# Patient Record
Sex: FEMALE | Race: BLACK OR AFRICAN AMERICAN
[De-identification: names, ages, dates, MRNs, and addresses within clinical notes are randomized per-mention and may not be internally consistent; named-entity substitution may affect disease eponyms.]

---

## 2017-03-17 NOTE — EMERGENCY ROOM REPORT
History of Present Illness


General


Chief Complaint:  Upper Extremity Injury


Source:  Patient





Present Illness


HPI


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  She felt a strain in her right 

shoulder with increased pain.  She's had a history of tendinitis in her past.  

The pain radiates around to her back and slightly up towards the neck.  She 

denies any numbness.  The pain is 9/10.  Worsened when she moves her arm.





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  She 

drove herself here.





No fevers, chest pain, dyspnea, NVD, neck pain.


Allergies:  


Coded Allergies:  


     PENICILLINS (Verified  Allergy, rash, 13)





Patient History


Past Medical History:  see triage record


Social History:  Denies: smoking


Social History Narrative


patient care


Last Menstrual Period:  3/17/2017


Pregnant Now:  No


:  0


Para:  0





Nursing Documentation-PMH


Past Medical History:  No Stated History





Review of Systems


All Other Systems:  negative except mentioned in HPI





Physical Exam





Vital Signs








  Date Time  Temp Pulse Resp B/P Pulse Ox O2 Delivery O2 Flow Rate FiO2


 


3/17/17 20:56 98.1 84 16 142/88 100 Room Air  








Sp02 EP Interpretation:  reviewed, normal


General Appearance:  well appearing, no apparent distress, GCS 15


Head:  normocephalic, atraumatic


Eyes:  bilateral eye PERRL, bilateral eye normal inspection


ENT:  hearing grossly normal, normal voice


Neck:  full range of motion, supple


Respiratory:  chest non-tender, no respiratory distress, speaking full sentences


Cardiovascular #2:  2+ radial (R)


Musculoskeletal:  decreased range of mation - R shoulder - unable to lift to 

breast height or adduct.  No elbow tenderness.  No deformity.  No AC joint pain


Neurologic:  alert, motor strength/tone normal, DTRs symmetric, sensory intact, 

normal gait, speech normal


Psychiatric:  mood/affect normal


Reflexes:  2+ bicep (R)


Skin:  no rash





Medical Decision Making


Diagnostic Impression:  


 Primary Impression:  


 Right shoulder strain


 Qualified Codes:  S46.911A - Strain of unspecified muscle, fascia and tendon 

at shoulder and upper arm level, right arm, initial encounter


ER Course


Patient presents with R shoulder pain.  Ddx; sprain, strain, bursitis.  Exam 

against fx or dislocation.  Some suggestion of rotator cuff issue.  Needs 

analgesia.  





X rays not indicated due to mechanism of injury and exam.





Sling applied by tech. Position good and neurovasc normal per my exam.  

Improved with sling.





Improved with treatment.  Insisting on time off of work instead of limited work.





Patient stable for outpatient observation and treatment.





Last Vital Signs








  Date Time  Temp Pulse Resp B/P Pulse Ox O2 Delivery O2 Flow Rate FiO2


 


3/17/17 22:04 98.1 84 16 142/88 100 Room Air  








Status:  improved


Disposition:  HOME, SELF-CARE


Condition:  Improved


Scripts


Tramadol Hcl* (ULTRAM*) 50 Mg Tablet


50 MG ORAL Q6H Y for For Pain, #10 TAB 0 Refills


   Prov: Yariel Bullock M.D.         3/17/17 


Ibuprofen* (MOTRIN*) 600 Mg Tablet


600 MG ORAL Q6H Y for For Pain, #20 TAB


   Prov: Yariel Bullock M.D.         3/17/17











Yariel Bullock M.D. Mar 17, 2017 21:24

## 2017-06-15 NOTE — EMERGENCY ROOM REPORT
History of Present Illness


General


Chief Complaint:  Lower Back Pain or Injury


Source:  Patient





Present Illness


HPI


28 YO Female presents to the ED c/o Left-sided low back pain that shoots down 

the  leg x 2 day(s), acute onset after strenuous activity. denies N/V/F/C , no 

hx of neoplastic disease. Denies trauma or fall.  Patient denies paresthesias 

in the lower extremities she states that with certain movements or laying flat 

pain will shoot down into the left buttock intermittently.  Denies erythema, 

bruising, or increased temperature.Denies abdominal pain, pregnancy, dysuria, 

hematuria, or frequency. Denies numbness tingling or loss of sensation or gross 

motor movements of the extremities, incontinence of bowel or bladder. Denies CP

, Palpitations, LOC, AMS, dizziness, Changes in Vision, Sensation, paresthesias

, or a sudden severe headache.


Allergies:  


Coded Allergies:  


     PENICILLINS (Verified  Allergy, rash, 7/6/13)





Patient History


Past Medical History:  see triage record


Past Surgical History:  none


Pertinent Family History:  none


Last Menstrual Period:  6/5/17


Pregnant Now:  No


Reviewed Nursing Documentation:  PMH: Agreed, PSxH: Agreed





Nursing Documentation-PMH


Past Medical History:  No Stated History





Review of Systems


All Other Systems:  negative except mentioned in HPI





Physical Exam





Vital Signs








  Date Time  Temp Pulse Resp B/P Pulse Ox O2 Delivery O2 Flow Rate FiO2


 


6/15/17 12:43 98.2 80 16 143/84 100   








Sp02 EP Interpretation:  reviewed, normal


General Appearance:  no apparent distress, alert, GCS 15, non-toxic


Head:  normocephalic, atraumatic


Eyes:  bilateral eye PERRL, bilateral eye normal inspection


ENT:  hearing grossly normal, normal pharynx, no angioedema, normal voice


Neck:  full range of motion, supple/symm/no masses


Respiratory:  chest non-tender, lungs clear, normal breath sounds, speaking 

full sentences


Cardiovascular #1:  regular rate, rhythm, no edema


Musculoskeletal:  back normal, gait/station normal, normal range of motion, 

tender - left sided lumbar ttp, left upper gluteal ttp, FROM with pain, unable 

to tolerate lying flat or straight leg raise.


Neurologic:  alert, oriented x3, responsive, motor strength/tone normal, 

sensory intact, speech normal


Psychiatric:  judgement/insight normal, memory normal, mood/affect normal


Skin:  normal color, no rash, warm/dry, well hydrated





Medical Decision Making


PA Attestation


Dr. Singh is my supervising Physician whom patient management has been 

discussed with.


Diagnostic Impression:  


 Primary Impression:  


 Low back pain with sciatica


 Qualified Codes:  M54.42 - Lumbago with sciatica, left side


ER Course


Pt. presents to the ED c/o Left-sided low back pain that shoots down the  leg x 

2 day(s), acute onset after strenuous activity. denies N/V/F/C , no hx of 

neoplastic disease.





Ddx considered but are not limited to Fracture, dislocation, contusion, 

epidural abscess, Sprain/Strain/Spasm





Vital signs: are WNL, pt. is afebrile


H&PE are most consistent with sciatica


- Pt. unable to tolerate straight leg raise. 





ORDERS:  X-ray not required at this time, no spinous process tenderness





ED INTERVENTIONS: 





-IM Toradol 45mg. 


- 350mg Soma PO


Re-Evaluation: pt. states her pain has subsided with ED interventions 








-I do not suspect an emergent condition at this time. with current presentation 

pt. is stable for close outpatient follow up.  d/w pt. follow up instructions, 

and ED return precautions. 





DISCHARGE: At this time pt. is stable for d/c to home. Will provide printed 

patient care instructions, and any necessary prescriptions. Care plan and 

follow up instructions have been discussed with the patient prior to discharge.





Last Vital Signs








  Date Time  Temp Pulse Resp B/P Pulse Ox O2 Delivery O2 Flow Rate FiO2


 


6/15/17 12:43 98.2 80 16 143/84 100   








Disposition:  HOME, SELF-CARE


Condition:  Stable


Departure Forms:  Return to Work      Return to Work Date:  Jun 19, 2017


   Work Restrictions:  No Heavy Lifting, No Prolonged Standing


   Other Restrictions:  light duty x 1 week.


   Return to Full Activity:  Jun 26, 2017


Patient Instructions:  Lumbosacral Strain





Additional Instructions:  


Take medications as directed. 


Follow up with PCP in 3-5 days 


Return sooner to ED if new symptoms occur, or current symptoms become worse. 


Do not drink alcohol, drive, or operate heavy machinery while taking muscle 

relaxers as this may cause drowsiness. 











- Please note that this Emergency Department Report was dictated using BitPoster technology software, occasionally this can lead to 

erroneous entry secondary to interpretation by the dictation equipment.











Hodan Gore Carlos 15, 2017 13:06

## 2017-09-18 NOTE — EMERGENCY ROOM REPORT
History of Present Illness


General


Chief Complaint:  Upper Respiratory Illness


Source:  Patient


 (Hodan Gore)





Present Illness


HPI


27-year-old female presents to the emergency department complaining of body 

aches, fevers, sore throat, dry cough, nasal congestion, and  nausea since 

yesterday.  Patient reports generalized malaise she denies chills.  Reports 

moderate nasal congestion.  Patient also reports intermittent 5/10 headache 

that is dull and progressive onset.  Patient denies episodes of vomiting.  She 

has been around many ill persons.  Patient states she is up-to-date with 

vaccinations.  Denies abdominal pain denies abdominal tenderness denies rash.  

Denies neck pain or stiffness.  Patient states she's been taking Tylenol for 

fevers.  Denies CP, Palpitations, LOC, AMS, dizziness, Changes in Vision, 

Sensation, paresthesias, or a sudden severe headache.


 (Hodan Gore)


Allergies:  


Coded Allergies:  


     PENICILLINS (Verified  Allergy, rash, 7/6/13)





Patient History


Past Medical History:  see triage record


Past Surgical History:  none


Pertinent Family History:  none


Pregnant Now:  No


Immunizations:  UTD


Reviewed Nursing Documentation:  PMH: Agreed, PSxH: Agreed (Hodan Gore)





Nursing Documentation-PMH


Past Medical History:  No Stated History


 (Hodan Gore)





Review of Systems


All Other Systems:  negative except mentioned in HPI


 (Hodan Gore)





Physical Exam





Vital Signs








  Date Time  Temp Pulse Resp B/P (MAP) Pulse Ox O2 Delivery O2 Flow Rate FiO2


 


9/18/17 12:18 98.2 86 18 120/80 98 Room Air  








Sp02 EP Interpretation:  reviewed, normal


General Appearance:  no apparent distress, alert, GCS 15, non-toxic


Head:  normocephalic, atraumatic


Eyes:  bilateral eye normal inspection, bilateral eye PERRL


ENT:  hearing grossly normal, normal pharynx, normal voice, TMs + canals normal

, uvula midline, moist mucus membranes, nasal congestion, pharyngeal erythema


Neck:  full range of motion, no meningismus, no bony tend, supple/symm/no masses


Respiratory:  lungs clear, normal breath sounds, no wheezing, speaking full 

sentences


Cardiovascular #1:  regular rate, rhythm


Gastrointestinal:  normal bowel sounds, non tender, soft, no guarding, no 

rebound


Rectal:  deferred


Genitourinary:  normal inspection, no CVA tenderness


Musculoskeletal:  gait/station normal, normal range of motion, non-tender


Neurologic:  alert, oriented x3, responsive, motor strength/tone normal, 

sensory intact, speech normal


Psychiatric:  judgement/insight normal, memory normal, mood/affect normal


Skin:  normal color, no rash, warm/dry, well hydrated


Lymphatic:  no adenopathy


 (Hodan Gore)





Medical Decision Making


PA Attestation


 Dr. Blevins is my supervising Physician whom patient management has been 

discussed with. 


 (Hodan Gore)


Diagnostic Impression:  


 Primary Impression:  


 Upper respiratory infection


 Qualified Codes:  J06.9 - Acute upper respiratory infection, unspecified; 

B97.89 - Other viral agents as the cause of diseases classified elsewhere


 Additional Impressions:  


 Gastritis


 Qualified Codes:  K29.00 - Acute gastritis without bleeding


 UTI (urinary tract infection)


 Qualified Codes:  N30.00 - Acute cystitis without hematuria


ER Course


27-year-old female presents to the emergency department complaining of body 

aches, fevers, sore throat, dry cough, nasal congestion, and  nausea since 

yesterday.  Patient reports generalized malaise she denies chills.  Reports 

moderate nasal congestion.  Patient also reports intermittent 5/10 headache 

that is dull and progressive onset.  Patient denies episodes of vomiting.  She 

has been around many ill persons.  Patient states she is up-to-date with 

vaccinations.  Denies abdominal pain denies abdominal tenderness denies rash.  

Denies neck pain or stiffness.  Patient states she's been taking Tylenol for 

fevers.  Denies CP, Palpitations, LOC, AMS, dizziness, Changes in Vision, 

Sensation, paresthesias, or a sudden severe headache.








Ddx considered but are not limited to URI, pneumonia, PE, strep pharyngitis, 

meningitis.,  Viral GE, pregnancy just to name a few





Vital signs: Pt. is afebrile, the remaining VS are WNL


H&PE are most consistent with Viral syndrome - no meningeal signs, oropharynx 

is not involved, no evidence of bacterial infection at this time.  Will check 

urine for UTI and pregnancy due to nausea and HA's. 





ORDERS: 


-UA: NItrite positive, moderate bacteria and elevated leukocytes indicating UTI.


-Urine Hcg: Negative





ED INTERVENTIONS: None required at this time. 











DISCHARGE: At this time pt. is stable for d/c to home. Will provide printed 

patient care instructions, and any necessary prescriptions. Care plan and 

follow up instructions have been discussed with the patient prior to discharge.





Labs








Test


  9/18/17


12:36


 


Urine Color Yellow 


 


Urine Appearance


  Slightly


cloudy


 


Urine pH 5 (4.5-8.0) 


 


Urine Specific Gravity


  1.020


(1.005-1.035)


 


Urine Protein 1+ (NEGATIVE) 


 


Urine Glucose (UA)


  Negative


(NEGATIVE)


 


Urine Ketones


  Negative


(NEGATIVE)


 


Urine Occult Blood 5+ (NEGATIVE) 


 


Urine Nitrite


  Positive


(NEGATIVE)


 


Urine Bilirubin


  Negative


(NEGATIVE)


 


Urine Urobilinogen


  Normal MG/DL


(0.0-1.0)


 


Urine Leukocyte Esterase 2+ (NEGATIVE) 


 


Urine RBC


  15-20 /HPF (0


- 2)


 


Urine WBC


  2-4 /HPF (0 -


2)


 


Urine Squamous Epithelial


Cells Few /LPF


(NONE/OCC)


 


Urine Bacteria


  Moderate /HPF


(NONE)


 


Urine HCG, Qualitative Negative 








 (Hodan Gore P.ARajesh)


ER Course


Culture growing Escherichia coli, sensitive to Macrobid, already prescribed to 

the patient


 (Verona Blevins M.D.)





Last Vital Signs








  Date Time  Temp Pulse Resp B/P (MAP) Pulse Ox O2 Delivery O2 Flow Rate FiO2


 


9/18/17 12:18 98.2 86 18 120/80 98 Room Air  








 (Hodan Gore.FARIDEH)


Disposition:  HOME, SELF-CARE


Condition:  Stable


Scripts


Nitrofurantoin Monohyd/M-Cryst* (MACROBID 100 MG*) 100 Mg Capsule


100 MG ORAL EVERY 12 HOURS for 5 Days, #10 CAP


   Prov: Hodan Gore         9/18/17 


Acetaminophen* (TYLENOL EXTRA STRENGTH*) 500 Mg Tablet


500 MG ORAL Q6H Y for Mild Pain/Temp > 100.5, #20 TAB 0 Refills


   Prov: Hodan Gore.ARajesh         9/18/17 


Pseudoephedrine Hcl* (NEXAFED*) 30 Mg Tablet


30 MG ORAL Q6H Y for congestion, #20 TAB


   Prov: Hodan Gore P.A.         9/18/17 


Guaifenesin (Guaifenesin) 1,200 Mg Tab.er.12h


1200 MG PO BID for 10 Days, #20 TAB


   Prov: Hodan Gore.ARajesh         9/18/17 


Ondansetron Odt* (ZOFRAN ODT*) 4 Mg Tab.rapdis


4 MG ORAL Q6H Y for Nausea & Vomiting, #20 TAB


   Prov: Hodan Gore         9/18/17 


Codeine/Promethazine Hcl* (PROMETHAZINE-CODEINE SYRUP*) 118 Ml Syrup


5 ML ORAL Q6H Y for For Cough, #118 ML 0 Refills


   Prov: Hodan Gore         9/18/17


Patient Instructions:  Upper Respiratory Infection, Adult





Additional Instructions:  


Take medications as directed. 


 ** Follow up with a Primary Care Provider in 3-5 days, even if your symptoms 

have resolved. ** 


--Please review list of primary care clinics, if you do not already have a 

primary care provider





Return sooner to ED if new symptoms occur, or current symptoms become worse. 


Do not drink alcohol, drive, or operate heavy machinery while taking cough 

syrup as this may cause drowsiness. 











- Please note that this Emergency Department Report was dictated using NetMovie technology software, occasionally this can lead to 

erroneous entry secondary to interpretation by the dictation equipment.











Hodan Gore Sep 18, 2017 12:24


Verona Blevins M.D. Sep 21, 2017 04:47

## 2017-11-10 NOTE — EMERGENCY ROOM REPORT
History of Present Illness


General


Chief Complaint:  Vaginal


Source:  Patient





Present Illness


HPI


Patient presents with complaints of vaginal discharge she reports a gray 

discharge


There is a somewhat foul smell to it as well





Patient felt that it was possibly yeast infection


Treatment for that has not improved her symptoms


Patient is sexually active


Has one partner





Denies any pelvic pain denies any dysuria frequency


Denies any fevers or chills


Allergies:  


Coded Allergies:  


     PENICILLINS (Verified  Allergy, rash, 7/6/13)





Patient History


Past Medical History:  see triage record


Pertinent Family History:  none


Last Menstrual Period:  NOV 4,2017


Reviewed Nursing Documentation:  PMH: Agreed, PSxH: Agreed





Nursing Documentation-PMH


Past Medical History:  No Stated History





Review of Systems


All Other Systems:  negative except mentioned in HPI





Physical Exam





Vital Signs








  Date Time  Temp Pulse Resp B/P (MAP) Pulse Ox O2 Delivery O2 Flow Rate FiO2


 


11/10/17 07:04 97.9 83 16 126/82 98 Room Air  








Sp02 EP Interpretation:  reviewed, normal


General Appearance:  well appearing, no apparent distress


Head:  normocephalic, atraumatic


Eyes:  bilateral eye PERRL, bilateral eye EOMI


ENT:  hearing grossly normal, normal pharynx, TMs + canals normal, uvula midline


Neck:  full range of motion, supple, no meningismus, no bony tend


Respiratory:  lungs clear, normal breath sounds, no rhonchi, no respiratory 

distress, no retraction, no accessory muscle use


Cardiovascular #1:  normal peripheral pulses, regular rate, rhythm, no edema, 

no gallop, no JVD, no murmur


Gastrointestinal:  normal bowel sounds, non tender, soft, no mass, no 

organomegaly, non-distended, no guarding, no hernia, no pulsatile mass, no 

rebound


Genitourinary:  no CVA tenderness, other - Pelvic exam with speculum reveals 

grayish discharge, no other laceration or retained products is seen


Neurologic:  oriented x3, responsive, CNs III-XII nml as tested, motor strength/

tone normal, sensory intact


Psychiatric:  mood/affect normal


Skin:  normal color, no rash, warm/dry, palpation normal


Lymphatic:  normal inspection, no adenopathy





Medical Decision Making


Diagnostic Impression:  


 Primary Impression:  


 vaginitis


ER Course


Patient has clinical exam and evaluation in line with likely vaginitis





Patient requires gynecology followup for further culture and outpatient 

reevaluation


At this time patient symptomatically treated for this clinical finding


And will have close followup





Labs








Test


  11/10/17


07:10


 


Urine Color Pale yellow 


 


Urine Appearance Cloudy 


 


Urine pH 7 (4.5-8.0) 


 


Urine Specific Gravity


  1.010


(1.005-1.035)


 


Urine Protein 1+ (NEGATIVE) 


 


Urine Glucose (UA)


  Negative


(NEGATIVE)


 


Urine Ketones


  Negative


(NEGATIVE)


 


Urine Occult Blood 1+ (NEGATIVE) 


 


Urine Nitrite


  Negative


(NEGATIVE)


 


Urine Bilirubin


  Negative


(NEGATIVE)


 


Urine Urobilinogen


  Normal MG/DL


(0.0-1.0)


 


Urine Leukocyte Esterase 3+ (NEGATIVE) 


 


Urine RBC


  20-30 /HPF (0


- 2)


 


Urine WBC


  60-80 /HPF (0


- 2)


 


Urine Squamous Epithelial


Cells Few /LPF


(NONE/OCC)


 


Urine Bacteria


  Moderate /HPF


(NONE)


 


Urine Trichomonas


  Few /HPF


(NONE)


 


Urine HCG, Qualitative Negative 











Last Vital Signs








  Date Time  Temp Pulse Resp B/P (MAP) Pulse Ox O2 Delivery O2 Flow Rate FiO2


 


11/10/17 08:13 97.9  16 126/82 98 Room Air  


 


11/10/17 07:04  83      








Status:  improved


Disposition:  HOME, SELF-CARE


Condition:  Stable


Scripts


Metronidazole* (METROGEL-VAGINAL*) 70 Gm Gel.w.appl


1 APPL VAGIN EVERY 12 HOURS, #70 GM


   Prov: DIOGENES CASTILLO D.O.         11/10/17


Referrals:  


[facility],REFERRING (PCP)


Patient Instructions:  Vaginitis, Easy-to-Read





Additional Instructions:  


Patient is provided with the discharge instructions notified to follow up with 

primary doctor in the next 2-3 days otherwise return to the er with any 

worsening symptoms.


Please note that this report is being documented using DRAGON technology.  This 

can lead to erroneous entry secondary to incorrect interpretation by the 

dictating instrument.











DIOGENES CASTILLO D.O. Nov 10, 2017 09:15

## 2017-11-10 NOTE — EMERGENCY ROOM REPORT
History of Present Illness


General


Chief Complaint:  Vaginal


Source:  Patient





Present Illness


HPI


Patient presents with complaints of vaginal discharge she reports a gray 

discharge


There is a somewhat foul smell to it as well





Patient felt that it was possibly yeast infection


Treatment for that has not improved her symptoms


Patient is sexually active


Has one partner





Denies any pelvic pain denies any dysuria frequency


Denies any fevers or chills


Allergies:  


Coded Allergies:  


     PENICILLINS (Verified  Allergy, rash, 7/6/13)





Patient History


Past Medical History:  see triage record


Pertinent Family History:  none


Last Menstrual Period:  NOV 4,2017


Reviewed Nursing Documentation:  PMH: Agreed, PSxH: Agreed





Nursing Documentation-PMH


Past Medical History:  No Stated History





Review of Systems


All Other Systems:  negative except mentioned in HPI





Physical Exam





Vital Signs








  Date Time  Temp Pulse Resp B/P (MAP) Pulse Ox O2 Delivery O2 Flow Rate FiO2


 


11/10/17 07:04 97.9 83 16 126/82 98 Room Air  








Sp02 EP Interpretation:  reviewed, normal


General Appearance:  well appearing, no apparent distress


Head:  normocephalic, atraumatic


Eyes:  bilateral eye PERRL, bilateral eye EOMI


ENT:  hearing grossly normal, normal pharynx, TMs + canals normal, uvula midline


Neck:  full range of motion, supple, no meningismus, no bony tend


Respiratory:  lungs clear, normal breath sounds, no rhonchi, no respiratory 

distress, no retraction, no accessory muscle use


Cardiovascular #1:  normal peripheral pulses, regular rate, rhythm, no edema, 

no gallop, no JVD, no murmur


Gastrointestinal:  normal bowel sounds, non tender, soft, no mass, no 

organomegaly, non-distended, no guarding, no hernia, no pulsatile mass, no 

rebound


Genitourinary:  no CVA tenderness, other - Pelvic exam with speculum reveals 

grayish discharge, no other laceration or retained products is seen


Neurologic:  oriented x3, responsive, CNs III-XII nml as tested, motor strength/

tone normal, sensory intact


Psychiatric:  mood/affect normal


Skin:  normal color, no rash, warm/dry, palpation normal


Lymphatic:  normal inspection, no adenopathy





Medical Decision Making


Diagnostic Impression:  


 Primary Impression:  


 vaginitis


ER Course


Patient has clinical exam and evaluation in line with likely vaginitis





Patient requires gynecology followup for further culture and outpatient 

reevaluation


At this time patient symptomatically treated for this clinical finding


And will have close followup





Labs








Test


  11/10/17


07:10


 


Urine Color Pale yellow 


 


Urine Appearance Cloudy 


 


Urine pH 7 (4.5-8.0) 


 


Urine Specific Gravity


  1.010


(1.005-1.035)


 


Urine Protein 1+ (NEGATIVE) 


 


Urine Glucose (UA)


  Negative


(NEGATIVE)


 


Urine Ketones


  Negative


(NEGATIVE)


 


Urine Occult Blood 1+ (NEGATIVE) 


 


Urine Nitrite


  Negative


(NEGATIVE)


 


Urine Bilirubin


  Negative


(NEGATIVE)


 


Urine Urobilinogen


  Normal MG/DL


(0.0-1.0)


 


Urine Leukocyte Esterase 3+ (NEGATIVE) 


 


Urine RBC


  20-30 /HPF (0


- 2)


 


Urine WBC


  60-80 /HPF (0


- 2)


 


Urine Squamous Epithelial


Cells Few /LPF


(NONE/OCC)


 


Urine Bacteria


  Moderate /HPF


(NONE)


 


Urine Trichomonas


  Few /HPF


(NONE)


 


Urine HCG, Qualitative Negative 











Last Vital Signs








  Date Time  Temp Pulse Resp B/P (MAP) Pulse Ox O2 Delivery O2 Flow Rate FiO2


 


11/10/17 08:13 97.9  16 126/82 98 Room Air  


 


11/10/17 07:04  83      








Status:  improved


Disposition:  HOME, SELF-CARE


Condition:  Stable


Scripts


Metronidazole* (METROGEL-VAGINAL*) 70 Gm Gel.w.appl


1 APPL VAGIN EVERY 12 HOURS, #70 GM


   Prov: DIOGENES CASTILLO D.O.         11/10/17


Referrals:  


[facility],REFERRING (PCP)


Patient Instructions:  Vaginitis, Easy-to-Read





Additional Instructions:  


Patient is provided with the discharge instructions notified to follow up with 

primary doctor in the next 2-3 days otherwise return to the er with any 

worsening symptoms.


Please note that this report is being documented using DRAGON technology.  This 

can lead to erroneous entry secondary to incorrect interpretation by the 

dictating instrument.











DIOGENES CASTILLO D.O. Nov 10, 2017 09:15

## 2017-11-10 NOTE — EMERGENCY ROOM REPORT
History of Present Illness


General


Chief Complaint:  Vaginal


Source:  Patient





Present Illness


HPI


Patient presents with complaints of vaginal discharge she reports a gray 

discharge


There is a somewhat foul smell to it as well





Patient felt that it was possibly yeast infection


Treatment for that has not improved her symptoms


Patient is sexually active


Has one partner





Denies any pelvic pain denies any dysuria frequency


Denies any fevers or chills


Allergies:  


Coded Allergies:  


     PENICILLINS (Verified  Allergy, rash, 7/6/13)





Patient History


Past Medical History:  see triage record


Pertinent Family History:  none


Last Menstrual Period:  NOV 4,2017


Reviewed Nursing Documentation:  PMH: Agreed, PSxH: Agreed





Nursing Documentation-PMH


Past Medical History:  No Stated History





Review of Systems


All Other Systems:  negative except mentioned in HPI





Physical Exam





Vital Signs








  Date Time  Temp Pulse Resp B/P (MAP) Pulse Ox O2 Delivery O2 Flow Rate FiO2


 


11/10/17 07:04 97.9 83 16 126/82 98 Room Air  








Sp02 EP Interpretation:  reviewed, normal


General Appearance:  well appearing, no apparent distress


Head:  normocephalic, atraumatic


Eyes:  bilateral eye PERRL, bilateral eye EOMI


ENT:  hearing grossly normal, normal pharynx, TMs + canals normal, uvula midline


Neck:  full range of motion, supple, no meningismus, no bony tend


Respiratory:  lungs clear, normal breath sounds, no rhonchi, no respiratory 

distress, no retraction, no accessory muscle use


Cardiovascular #1:  normal peripheral pulses, regular rate, rhythm, no edema, 

no gallop, no JVD, no murmur


Gastrointestinal:  normal bowel sounds, non tender, soft, no mass, no 

organomegaly, non-distended, no guarding, no hernia, no pulsatile mass, no 

rebound


Genitourinary:  no CVA tenderness, other - Pelvic exam with speculum reveals 

grayish discharge, no other laceration or retained products is seen


Neurologic:  oriented x3, responsive, CNs III-XII nml as tested, motor strength/

tone normal, sensory intact


Psychiatric:  mood/affect normal


Skin:  normal color, no rash, warm/dry, palpation normal


Lymphatic:  normal inspection, no adenopathy





Medical Decision Making


Diagnostic Impression:  


 Primary Impression:  


 vaginitis


ER Course


Patient has clinical exam and evaluation in line with likely vaginitis





Patient requires gynecology followup for further culture and outpatient 

reevaluation


At this time patient symptomatically treated for this clinical finding


And will have close followup





Labs








Test


  11/10/17


07:10


 


Urine Color Pale yellow 


 


Urine Appearance Cloudy 


 


Urine pH 7 (4.5-8.0) 


 


Urine Specific Gravity


  1.010


(1.005-1.035)


 


Urine Protein 1+ (NEGATIVE) 


 


Urine Glucose (UA)


  Negative


(NEGATIVE)


 


Urine Ketones


  Negative


(NEGATIVE)


 


Urine Occult Blood 1+ (NEGATIVE) 


 


Urine Nitrite


  Negative


(NEGATIVE)


 


Urine Bilirubin


  Negative


(NEGATIVE)


 


Urine Urobilinogen


  Normal MG/DL


(0.0-1.0)


 


Urine Leukocyte Esterase 3+ (NEGATIVE) 


 


Urine RBC


  20-30 /HPF (0


- 2)


 


Urine WBC


  60-80 /HPF (0


- 2)


 


Urine Squamous Epithelial


Cells Few /LPF


(NONE/OCC)


 


Urine Bacteria


  Moderate /HPF


(NONE)


 


Urine Trichomonas


  Few /HPF


(NONE)


 


Urine HCG, Qualitative Negative 











Last Vital Signs








  Date Time  Temp Pulse Resp B/P (MAP) Pulse Ox O2 Delivery O2 Flow Rate FiO2


 


11/10/17 08:13 97.9  16 126/82 98 Room Air  


 


11/10/17 07:04  83      








Status:  improved


Disposition:  HOME, SELF-CARE


Condition:  Stable


Scripts


Metronidazole* (METROGEL-VAGINAL*) 70 Gm Gel.w.appl


1 APPL VAGIN EVERY 12 HOURS, #70 GM


   Prov: DIOGENES CASTILLO D.O.         11/10/17


Referrals:  


[facility],REFERRING (PCP)


Patient Instructions:  Vaginitis, Easy-to-Read





Additional Instructions:  


Patient is provided with the discharge instructions notified to follow up with 

primary doctor in the next 2-3 days otherwise return to the er with any 

worsening symptoms.


Please note that this report is being documented using DRAGON technology.  This 

can lead to erroneous entry secondary to incorrect interpretation by the 

dictating instrument.











DIOGENES CASTILLO D.O. Nov 10, 2017 09:15

## 2018-01-26 NOTE — EMERGENCY ROOM REPORT
History of Present Illness


General


Chief Complaint:  General Complaint


Source:  Patient, Medical Record





Present Illness


HPI


Healthy adult female presents with missed period


Denies vaginal bleeding, discharge, abdominal pain, urinary complaints


LMP was December 2nd week


To home pregnancy tests and was positive


Wanted to check again


Has an OB/GYN that she wants to followup with


Just came to make sure she was pregnant


Allergies:  


Coded Allergies:  


     PENICILLINS (Verified  Allergy, rash, 7/6/13)





Patient History


Past Medical History:  see triage record


Past Surgical History:  none


Last Menstrual Period:  12/01/17


Reviewed Nursing Documentation:  PMH: Agreed, PSxH: Agreed





Nursing Documentation-PMH


Past Medical History:  No History, Except For





Review of Systems


All Other Systems:  negative except mentioned in HPI





Physical Exam





Vital Signs








  Date Time  Temp Pulse Resp B/P (MAP) Pulse Ox O2 Delivery O2 Flow Rate FiO2


 


1/26/18 11:35 98.2 94 18 127/82 95 Room Air  








Sp02 EP Interpretation:  reviewed, normal


General Appearance:  no apparent distress, alert, non-toxic


Head:  normocephalic


Eyes:  bilateral eye normal inspection, bilateral eye PERRL, bilateral eye EOMI


ENT:  normal ENT inspection, hearing grossly normal, normal pharynx, no 

angioedema, normal voice, moist mucus membranes


Neck:  normal inspection, full range of motion, supple, supple/symm/no masses


Respiratory:  chest non-tender, lungs clear, normal breath sounds, chest 

symmetrical, palpation of chest normal


Cardiovascular #1:  normal peripheral pulses, regular rate, rhythm


Cardiovascular #2:  2+ radial (R), 2+ radial (L)


Gastrointestinal:  normal inspection, non tender, soft, no mass, no guarding, 

no rebound


Rectal:  deferred


Genitourinary:  normal inspection, no CVA tenderness


Musculoskeletal:  back normal, gait/station normal, normal range of motion, non-

tender, no calf tenderness


Neurologic:  alert, responsive, CNs III-XII nml as tested, motor strength/tone 

normal, sensory intact, speech normal


Psychiatric:  judgement/insight normal, memory normal, mood/affect normal, no 

suicidal/homicidal ideation


Skin:  normal color, no rash, warm/dry, normal turgor


Lymphatic:  no adenopathy





Medical Decision Making


Diagnostic Impression:  


 Primary Impression:  


 Pregnancy


ER Course


Patient with no complaints will be discharged diagnosis pregnancy





Last Vital Signs








  Date Time  Temp Pulse Resp B/P (MAP) Pulse Ox O2 Delivery O2 Flow Rate FiO2


 


1/26/18 11:35 98.2 94 18 127/82 95 Room Air  








Status:  improved


Disposition:  HOME, SELF-CARE


Condition:  Stable


Patient Instructions:  Pregnancy and Sex











KATE GARCIA M.D Jan 26, 2018 11:54

## 2018-05-23 NOTE — EMERGENCY ROOM REPORT
History of Present Illness


General


Chief Complaint:  Female Urogenital Problems


Source:  Patient





Present Illness


\[facility] [place] [facility]\""


The patient presents with a clear vaginal discharge.  In the past she had 

bacterial vaginosis and was treated with Flagyl for the same problem.  Cleared 

up at that time.  Her last period was normal.  No dysuria.  No fevers, chills.  

No suprapubic pain. 





No NVD.  No URI sy, dyspnea, chest pain, rashes, headache, anxiety.





The patient had a miscarriage in January.


Allergies:  


Coded Allergies:  


     PENICILLINS (Verified  Allergy, rash, 7/6/13)





Patient History


Past Surgical History:  unable to obtain


Social History:  Denies: smoking


Social History Narrative


CNA


Last Menstrual Period:  05/05/18


Reviewed Nursing Documentation:  PMH: Agreed; PSxH: Agreed





Nursing Documentation-PMH


Past Medical History:  No Stated History





Review of Systems


All Other Systems:  negative except mentioned in HPI





Physical Exam





Vital Signs








  Date Time  Temp Pulse Resp B/P (MAP) Pulse Ox O2 Delivery O2 Flow Rate FiO2


 


5/23/18 08:54 98.3 83 18 132/84 99 Room Air  





 98.2       








Sp02 EP Interpretation:  reviewed, normal


General Appearance:  well appearing, no apparent distress


Head:  normocephalic, atraumatic


Eyes:  bilateral eye normal inspection, bilateral eye PERRL


ENT:  hearing grossly normal, normal voice, moist mucus membranes


Neck:  full range of motion, supple


Respiratory:  no respiratory distress, speaking full sentences


Gastrointestinal:  normal inspection, normal bowel sounds, non tender


Genitourinary:  no CVA tenderness, adnexa normal, cervix normal, ext genitalia/

vag normal, os closed, uterus normal, other - some d/c, yellowish


Musculoskeletal:  no calf tenderness


Neurologic:  alert, normal gait, grossly normal


Psychiatric:  mood/affect normal


Skin:  no rash





Medical Decision Making


Diagnostic Impression:  


 Primary Impression:  


 Bacterial vaginosis


ER Course


Patient presents with vaginal d/c.  Ddx; BV, trichomonas, UTI, yeast amongst 

others.  Will send wet mount and UA.





UA clear.  Wet mount with clue cells.





Patient started on Flagyl.





Patient stable for outpatient observation and treatment.





Laboratory Tests








Test


  5/23/18


09:07


 


Urine Color Pale yellow  


 


Urine Appearance Clear  


 


Urine pH 7 (4.5-8.0)  


 


Urine Specific Gravity


  1.010


(1.005-1.035)


 


Urine Protein


  Negative


(NEGATIVE)


 


Urine Glucose (UA)


  Negative


(NEGATIVE)


 


Urine Ketones


  Negative


(NEGATIVE)


 


Urine Occult Blood


  Negative


(NEGATIVE)


 


Urine Nitrite


  Negative


(NEGATIVE)


 


Urine Bilirubin


  Negative


(NEGATIVE)


 


Urine Urobilinogen


  Normal MG/DL


(0.0-1.0)


 


Urine Leukocyte Esterase


  1+ (NEGATIVE)


H


 


Urine RBC


  0-2 /HPF (0 -


2)


 


Urine WBC


  0-2 /HPF (0 -


2)


 


Urine Squamous Epithelial


Cells Few /LPF


(NONE/OCC)


 


Urine Bacteria


  Few /HPF


(NONE)


 


Urine HCG, Qualitative


  Negative


(NEGATIVE)








Microbiology








 Date/Time


Source Procedure


Growth Status


 


 


 5/23/18 10:00


Vaginal Wet Prep - Final Complete











Last Vital Signs








  Date Time  Temp Pulse Resp B/P (MAP) Pulse Ox O2 Delivery O2 Flow Rate FiO2


 


5/23/18 15:58 98.3 80 18 132/84 99 Room Air  





 98.2       








Status:  improved


Disposition:  HOME, SELF-CARE


Condition:  Improved


Scripts


Metronidazole* (FLAGYL*) 500 Mg Tablet


500 MG ORAL BID, #14 TAB


   Prov: Yariel Bullock M.D.         5/23/18


Referrals:  


[facility],REFERRING (PCP)











Yariel Bullock M.D. May 23, 2018 10:41
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
## 2018-11-07 NOTE — DIAGNOSTIC IMAGING REPORT
Indication: Back pain

 

Comparison: None

 

Findings: 3 views of the lumbar spine were obtained. 

 

No acute fracture or malalignment is identified.  Vertebral body heights and disk

spaces are well maintained.  Posterior elements are unremarkable.  

 

Impression:  No acute findings.

## 2018-11-07 NOTE — DIAGNOSTIC IMAGING REPORT
Indication: Neck Pain

 

Findings: 3  views of the cervical spine were obtained.

 

There is no acute fracture identified. Alignment is normal. The open-mouth odontoid

view shows an intact dens and good alignment of the lateral masses with respect to

the body of C2. There is no soft tissue swelling.

 

 

Impression: Negative cervical spine examination.

## 2018-11-07 NOTE — DIAGNOSTIC IMAGING REPORT
Indication: Back pain

 

Comparison: None

 

Findings: 

 

2 views of the thoracic spine were obtained. 

 

Normal alignment is demonstrated. Vertebral body heights and intervertebral disc

heights are normal. The posterior elements including the facets are unremarkable.

Soft tissues are unremarkable.

 

Impression: No acute injury appreciated.

## 2019-12-26 ENCOUNTER — HOSPITAL ENCOUNTER (EMERGENCY)
Dept: HOSPITAL 72 - EMR | Age: 29
Discharge: HOME | End: 2019-12-26
Payer: COMMERCIAL

## 2019-12-26 VITALS — SYSTOLIC BLOOD PRESSURE: 139 MMHG | DIASTOLIC BLOOD PRESSURE: 86 MMHG

## 2019-12-26 VITALS — HEIGHT: 64 IN | WEIGHT: 158 LBS | BODY MASS INDEX: 26.98 KG/M2

## 2019-12-26 VITALS — DIASTOLIC BLOOD PRESSURE: 84 MMHG | SYSTOLIC BLOOD PRESSURE: 140 MMHG

## 2019-12-26 DIAGNOSIS — N30.00: Primary | ICD-10-CM

## 2019-12-26 DIAGNOSIS — Z88.0: ICD-10-CM

## 2019-12-26 LAB
APPEARANCE UR: (no result)
APTT PPP: (no result) S
GLUCOSE UR STRIP-MCNC: NEGATIVE MG/DL
KETONES UR QL STRIP: NEGATIVE
LEUKOCYTE ESTERASE UR QL STRIP: (no result)
NITRITE UR QL STRIP: NEGATIVE
PH UR STRIP: 7 [PH] (ref 4.5–8)
PROT UR QL STRIP: NEGATIVE
SP GR UR STRIP: 1 (ref 1–1.03)
UROBILINOGEN UR-MCNC: NORMAL MG/DL (ref 0–1)

## 2019-12-26 PROCEDURE — 87210 SMEAR WET MOUNT SALINE/INK: CPT

## 2019-12-26 PROCEDURE — 87086 URINE CULTURE/COLONY COUNT: CPT

## 2019-12-26 PROCEDURE — 99283 EMERGENCY DEPT VISIT LOW MDM: CPT

## 2019-12-26 PROCEDURE — 81025 URINE PREGNANCY TEST: CPT

## 2019-12-26 PROCEDURE — 81003 URINALYSIS AUTO W/O SCOPE: CPT

## 2019-12-26 NOTE — NUR
ED Nurse Note:



Pt ambulated to ED with c/o vaginal discharge x 3 days. PT AOx4. Per pt d/c is 
clear in color. Denied pain, itchiness or any foul smell. Per pt she was dx 
with STD 2 days ago wc was treated already. Placed on bed.

## 2019-12-26 NOTE — EMERGENCY ROOM REPORT
History of Present Illness


General


Chief Complaint:  Female Urogenital Problems


Source:  Patient





Present Illness


HPI


28 YO female presents to the ED c/o vaginal d/c x 3 days. Denies pain, rashes, 

genital lesions, swollen tender lymph nodes, abdominal pain or tenderness or 

joint pain.  She denies malodor she denies dysuria, hematuria or urinary 

frequency.  Patient denies suspicion of STI she reports history of trichomonas 

in the past.  Patient denies recent unprotected intercourse and she denies 

suspicion of pregnancy. Denies fevers or chills. Denies vaginal bleeding. 

Patient denies recent antibiotic use no other aggravating or relieving factors.


Allergies:  


Coded Allergies:  


     PENICILLINS (Verified  Allergy, rash, 7/6/13)





Patient History


Past Medical History:  see triage record


Past Surgical History:  none


Pertinent Family History:  none


Pregnant Now:  No


Reviewed Nursing Documentation:  PMH: Agreed; PSxH: Agreed





Nursing Documentation-PMH


Past Medical History:  No Stated History





Review of Systems


All Other Systems:  negative except mentioned in HPI





Physical Exam





Vital Signs








  Date Time  Temp Pulse Resp B/P (MAP) Pulse Ox O2 Delivery O2 Flow Rate FiO2


 


12/26/19 12:38 97.9 80 16 138/92 (107) 99 Room Air  








Sp02 EP Interpretation:  reviewed, normal


General Appearance:  no apparent distress, alert, GCS 15, non-toxic


Head:  normocephalic, atraumatic


Eyes:  bilateral eye normal inspection, bilateral eye PERRL


ENT:  hearing grossly normal, normal voice


Neck:  full range of motion


Respiratory:  lungs clear, normal breath sounds, speaking full sentences


Cardiovascular #1:  regular rate, rhythm


Gastrointestinal:  normal bowel sounds, non tender, soft, non-distended, no 

guarding


Rectal:  deferred


Genitourinary:  normal inspection, no CVA tenderness, adnexa normal, bladder 

normal, ext genitalia/vag normal, other - some non odorus white vaginal d/c in 

the vaginal vault. No CMT. no adnexal tenderness.


Musculoskeletal:  back normal, normal range of motion, gait/station normal, non-

tender


Neurologic:  alert, motor strength/tone normal, oriented x3, sensory intact, 

responsive, speech normal


Psychiatric:  judgement/insight normal


Skin:  no rash, normal color, normal inspection


Lymphatic:  no adenopathy





Medical Decision Making


PA Attestation


Dr. Sepulveda Is my supervising Physician whom patient management has been 

discussed with.


Diagnostic Impression:  


 Primary Impression:  


 UTI (urinary tract infection)


 Qualified Codes:  N30.01 - Acute cystitis with hematuria


ER Course


28 YO female presents to the ED c/o vaginal d/c x 3 days. Denies pain, rashes, 

genital lesions, swollen tender lymph nodes, abdominal pain or tenderness or 

joint pain.  She denies malodor she denies dysuria, hematuria or urinary 

frequency.  Patient denies suspicion of STI she reports history of trichomonas 

in the past.  Patient denies recent unprotected intercourse and she denies 

suspicion of pregnancy. Denies fevers or chills. Denies vaginal bleeding. 

Patient denies recent antibiotic use no other aggravating or relieving factors.





Ddx considered but are not limited to UTi , Pyelo, STI, Stone, Cystitis





Vital signs: are WNL, pt. is afebrile 





H&PE are most consistent with UTI 





ORDERS:


- UA labs are attached - evidence of UTI-  moderate bacteria and elevation in 

inflammatory markers. 





-Wet Mount : Normal


 


-Urine Hcg: Negative





ED INTERVENTIONS: None required at this time. 





DISCHARGE: At this time pt. is stable for d/c to home. Will provide printed 

patient care instructions, and any necessary prescriptions. Care plan and 

follow up instructions have been discussed with the patient prior to discharge.





Labs








Test


  12/26/19


12:56


 


Urine Color Pale yellow 


 


Urine Appearance Cloudy 


 


Urine pH 7 (4.5-8.0) 


 


Urine Specific Gravity


  1.005


(1.005-1.035)


 


Urine Protein


  Negative


(NEGATIVE)


 


Urine Glucose (UA)


  Negative


(NEGATIVE)


 


Urine Ketones


  Negative


(NEGATIVE)


 


Urine Blood


  Negative


(NEGATIVE)


 


Urine Nitrite


  Negative


(NEGATIVE)


 


Urine Bilirubin


  Negative


(NEGATIVE)


 


Urine Urobilinogen


  Normal MG/DL


(0.0-1.0)


 


Urine Leukocyte Esterase 3+ (NEGATIVE) 


 


Urine RBC


  2-4 /HPF (0 -


2)


 


Urine WBC


  15-20 /HPF (0


- 2)


 


Urine Squamous Epithelial


Cells Many /LPF


(NONE/OCC)


 


Urine Bacteria


  Moderate /HPF


(NONE)


 


Urine HCG, Qualitative


  Negative


(NEGATIVE)











Last Vital Signs








  Date Time  Temp Pulse Resp B/P (MAP) Pulse Ox O2 Delivery O2 Flow Rate FiO2


 


12/26/19 12:45 98.1 85 18 140/84 100 Room Air  








Status:  improved


Disposition:  HOME, SELF-CARE


Condition:  Stable


Scripts


Nitrofurantoin Monohyd/M-Cryst* (MACROBID 100 MG*) 100 Mg Capsule


100 MG ORAL EVERY 12 HOURS for 5 Days, #10 CAP


   Prov: Hodan Gore         12/26/19


Patient Instructions:  Urinary Tract Infection





Additional Instructions:  


Take medications as directed. 





 ** Follow up with a Primary Care Provider in 3-5 days, even if your symptoms 

have resolved. ** 








Return sooner to ED if new symptoms occur, or current symptoms become worse. 














- Please note that this Emergency Department Report was dictated using [employer] technology software, occasionally this can lead to 

erroneous entry secondary to interpretation by the dictation equipment.











Hodan Gore Dec 26, 2019 13:47

## 2020-10-31 ENCOUNTER — HOSPITAL ENCOUNTER (EMERGENCY)
Dept: HOSPITAL 72 - EMR | Age: 30
Discharge: HOME | End: 2020-10-31
Payer: COMMERCIAL

## 2020-10-31 VITALS — DIASTOLIC BLOOD PRESSURE: 85 MMHG | SYSTOLIC BLOOD PRESSURE: 132 MMHG

## 2020-10-31 VITALS — BODY MASS INDEX: 25.61 KG/M2 | HEIGHT: 64 IN | WEIGHT: 150 LBS

## 2020-10-31 VITALS — DIASTOLIC BLOOD PRESSURE: 80 MMHG | SYSTOLIC BLOOD PRESSURE: 143 MMHG

## 2020-10-31 DIAGNOSIS — Z88.0: ICD-10-CM

## 2020-10-31 DIAGNOSIS — N76.0: Primary | ICD-10-CM

## 2020-10-31 PROCEDURE — 99283 EMERGENCY DEPT VISIT LOW MDM: CPT

## 2020-10-31 PROCEDURE — 87210 SMEAR WET MOUNT SALINE/INK: CPT

## 2020-10-31 PROCEDURE — 81025 URINE PREGNANCY TEST: CPT

## 2020-10-31 NOTE — EMERGENCY ROOM REPORT
History of Present Illness


General


Chief Complaint:  Vaginal


Source:  Patient





Present Illness


HPI


29 YO female presents to the ED c/o white vaginal discharge x9 days.  Patient 

reports initially white milky in character however she states the discharge over

the past 4 days has began to thicken and has some yellowish color.  And an odor.

 Patient denies pain.  She denies vaginal bleeding.  She denies genital lesions 

or sores.  She denies rashes.  Patient denies suspicion of STI.  Patient reports

she did have trichomonas in the past which presented differently.  Patient 

denies pregnancy.  Patient reports she did have unprotected intercourse 

approximately 2 weeks ago but with unknown partner.  She denies dysuria, 

hematuria, urinary frequency or urgency.  She denies abdominal pain/tenderness, 

nausea or vomiting.  Patient denies fevers or chills.  She denies swollen tender

lymph nodes or joint pain.


Allergies:  


Coded Allergies:  


     PENICILLINS (Verified  Allergy, rash, 7/6/13)





COVID-19 Screening


Contact w/high risk pt:  No


Experienced COVID-19 symptoms?:  No


COVID-19 Testing performed PTA:  Yes


COVID-19 Screening:  Negative COVID-19


COVID-19 Testing Source:  2 days ago





Patient History


Past Medical History:  see triage record


Past Surgical History:  none


Pertinent Family History:  none


Last Menstrual Period:  10/19/20


Pregnant Now:  No


Reviewed Nursing Documentation:  PMH: Agreed; PSxH: Agreed





Nursing Documentation-PMH


Past Medical History:  No Stated History





Review of Systems


All Other Systems:  negative except mentioned in HPI





Physical Exam





Vital Signs








  Date Time  Temp Pulse Resp B/P (MAP) Pulse Ox O2 Delivery O2 Flow Rate FiO2


 


10/31/20 13:51 98.6 92 20 134/87 (103) 97 Room Air  








Sp02 EP Interpretation:  reviewed, normal


General Appearance:  no apparent distress, alert, GCS 15, non-toxic


Head:  normocephalic, atraumatic


Eyes:  bilateral eye normal inspection, bilateral eye PERRL


ENT:  hearing grossly normal, normal voice


Neck:  full range of motion


Respiratory:  lungs clear, normal breath sounds, speaking full sentences


Cardiovascular #1:  regular rate, rhythm


Gastrointestinal:  normal bowel sounds, non tender, soft, non-distended, no 

guarding


Rectal:  deferred


Genitourinary:  normal inspection, no CVA tenderness, adnexa normal, cervix 

normal, ext genitalia/vag normal, other - no rashes or sores.  Milky white 

discharge noted in the vaginal vault.  No CMT. normal appearing cervix


Musculoskeletal:  normal range of motion, gait/station normal, non-tender


Neurologic:  alert, motor strength/tone normal, oriented x3, sensory intact, 

responsive, speech normal


Psychiatric:  judgement/insight normal


Skin:  no rash, normal color


Lymphatic:  no adenopathy





Medical Decision Making


PA Attestation


Dr. Blue is my supervising Physician whom patient management has been discussed

with.


Diagnostic Impression:  


   Primary Impression:  


   Bacterial vaginosis


ER Course


29 YO female presents to the ED c/o white vaginal discharge x9 days.  Patient 

reports initially white milky in character however she states the discharge over

the past 4 days has began to thicken and has some yellowish color.  And an odor.

 Patient denies pain.  She denies vaginal bleeding.  She denies genital lesions 

or sores.  She denies rashes.  Patient denies suspicion of STI.  Patient reports

she did have trichomonas in the past which presented differently.  Patient 

denies pregnancy.  Patient reports she did have unprotected intercourse ap

proximately 2 weeks ago but with unknown partner.  She denies dysuria, 

hematuria, urinary frequency or urgency.  She denies abdominal pain/tenderness, 

nausea or vomiting.  Patient denies fevers or chills.  She denies swollen tender

lymph nodes or joint pain.





Ddx considered but are not limited to UTi , Pyelo, STI, Stone, Cystitis, vaginal

laceration, vaginitis.


Vital signs: are WNL, pt. is afebrile 





H& PE are most consistent with:  Vaginitis 


 


ORDERS:


- Urine Hcg: negative


- Wet Mount : few clue cells--c/w BV





ED INTERVENTIONS:


 





-I do not identify an emergent condition at this time. With current 

presentation,  pt. is stable for close outpatient follow up and conservative 

treatment.  D/w pt. to return promptly to ED with worsening or new symptoms.- 

Pt. verbalizes' understanding and agreement with proposed treatment plan.





DISCHARGE: At this time pt. is stable for d/c to home. Will provide printed 

patient care instructions, and any necessary prescriptions. Care plan and follow

up instructions have been discussed with the patient prior to discharge. 

discussed with the patient prior to discharge.





Labs








Test


 10/31/20


14:00


 


Urine HCG, Qualitative


 Negative


(NEGATIVE)











Last Vital Signs








  Date Time  Temp Pulse Resp B/P (MAP) Pulse Ox O2 Delivery O2 Flow Rate FiO2


 


10/31/20 14:11 98.6  17 132/85 99 Room Air  


 


10/31/20 13:51  92      








Status:  improved


Disposition:  HOME, SELF-CARE


Condition:  Stable


Scripts


Metronidazole* (FLAGYL*) 500 Mg Tablet


500 MG ORAL BID for 7 Days, #14 TAB


   Prov: Hodan Gore         10/31/20


Patient Instructions:  Bacterial Vaginosis, Easy-to-Read





Additional Instructions:  


Take medications as directed. 


 ** Follow up with a Primary Care Provider in 3-5 days, even if your symptoms 

have resolved. ** 





Return sooner to ED if new symptoms occur, or current symptoms become worse. 











- Please note that this Emergency Department Report was dictated using Wonder[employer] technology software, occasionally this can lead to 

erroneous entry secondary to interpretation by the dictation equipment.











Hodan Gore              Oct 31, 2020 14:25